# Patient Record
Sex: FEMALE | URBAN - METROPOLITAN AREA
[De-identification: names, ages, dates, MRNs, and addresses within clinical notes are randomized per-mention and may not be internally consistent; named-entity substitution may affect disease eponyms.]

---

## 2021-08-12 ENCOUNTER — HOSPITAL ENCOUNTER (EMERGENCY)
Facility: HOSPITAL | Age: 41
Discharge: LEFT BEFORE DISCHARGE | End: 2021-08-12
Admitting: EMERGENCY MEDICINE

## 2021-08-12 VITALS — HEART RATE: 109 BPM | SYSTOLIC BLOOD PRESSURE: 116 MMHG | OXYGEN SATURATION: 99 % | DIASTOLIC BLOOD PRESSURE: 58 MMHG

## 2021-08-12 DIAGNOSIS — Z53.21 ELOPED FROM EMERGENCY DEPARTMENT: Primary | ICD-10-CM

## 2021-08-12 PROCEDURE — 99281 EMR DPT VST MAYX REQ PHY/QHP: CPT

## 2021-08-12 RX ORDER — MIDAZOLAM HYDROCHLORIDE 1 MG/ML
1 INJECTION INTRAMUSCULAR; INTRAVENOUS ONCE
Status: DISCONTINUED | OUTPATIENT
Start: 2021-08-12 | End: 2021-08-12 | Stop reason: HOSPADM

## 2021-08-12 NOTE — ED NOTES
"Pt stated she was ready to go.  RN went to notify MD that pt wished to leave.  Pt stated \"if you don't let me go I will call a \".  RN told pt that we could not keep her from leaving but that she wanted to let the MD know that pt was leaving.  Pt then stated \"you have an attitude, you've had an attitude with me since I walked in the door\".  Then pt proceeded to leave the ED with no s/s of distress, ambulatory with steady gait.  MD notified and MD asked RN to notify security.  RN notified security.       Marta Milton RN  08/12/21 1935    "

## 2021-08-12 NOTE — ED PROVIDER NOTES
Subjective   40-year-old female presents to the emergency department after reportedly ingesting hallucinogenic mushrooms. The patient was anxious and shaking on arrival and apparently flagged down EMS to bring her the rest of the way to the hospital. The patient reported hallucinations to them but denied it to me. She reported that she had no intent to self-harm or harm others. She denies homicidal or suicidal ideation patient reports that she was nauseated but that is getting better she reports no vomiting or diarrhea. She states she is unsure exactly when someone gave her medicine. She told the nurse that she had been just at the mushrooms about 90 minutes prior to arrival          Review of Systems   Unable to perform ROS: Other (Uncooperative appears under the influence of drugs)       No past medical history on file.  States she does not want to answer questions  Not on File    No past surgical history on file.    No family history on file.             Objective   Physical Exam  Alert M[place] Coma Scale 15 no evidence of active hallucination however the patient is tearful and very uncooperative   HEENT: Pupils equal and reactive to light. Conjunctivae are not injected. normal tympanic membranes. Oropharynx and nares are normal.   Neck: Supple. Midline trachea. No JVD. No goiter.   Chest: Clear and equal breath sounds bilaterally regular rate and rhythm without murmur or rub.   Abdomen: Positive bowel sounds nontender nondistended. No rebound or peritoneal signs. No CVA tenderness.   Extremities no clubbing cyanosis or edema motor sensory exam is normal the full range of motion is intact   skin: Warm and dry, no rashes or petechia.   Lymphatic: No regional lymphadenopathy. No calf pain, swelling or Rosalina's sign    Procedures           ED Course                                           MDM  Number of Diagnoses or Management Options  Risk of Complications, Morbidity, and/or Mortality  Presenting problems:  high  Diagnostic procedures: high  Management options: high  General comments: The patient apparently left the emergency department without telling anyone. Attempts to contact were unsuccessful. The police were notified        Final diagnoses:   Eloped from emergency department       ED Disposition  ED Disposition     None          No follow-up provider specified.       Medication List      No changes were made to your prescriptions during this visit.          Zenon Henson MD  08/12/21 0838